# Patient Record
Sex: FEMALE | Race: WHITE | NOT HISPANIC OR LATINO | Employment: FULL TIME | URBAN - METROPOLITAN AREA
[De-identification: names, ages, dates, MRNs, and addresses within clinical notes are randomized per-mention and may not be internally consistent; named-entity substitution may affect disease eponyms.]

---

## 2024-04-04 ENCOUNTER — TELEPHONE (OUTPATIENT)
Dept: ADMINISTRATIVE | Facility: OTHER | Age: 29
End: 2024-04-04

## 2024-04-04 NOTE — TELEPHONE ENCOUNTER
Upon review of the In Basket request we were able to locate, review, and update the patient chart as requested for Pap Smear (HPV) aka Cervical Cancer Screening.    Any additional questions or concerns should be emailed to the Practice Liaisons via the appropriate education email address, please do not reply via In Basket.    Thank you  Isidra Oliva

## 2024-04-04 NOTE — TELEPHONE ENCOUNTER
----- Message from Stepan Urbina sent at 4/4/2024  9:42 AM EDT -----  Regarding: care gap request  04/04/24 9:42 AM    Hello, our patient attached above has had Pap Smear (HPV) aka Cervical Cancer Screening completed/performed. Please assist in updating the patient chart by pulling the Care Everywhere (CE) document. The date of service is 7/30/21.     Thank you,  Stepan Urbina  [facility]

## 2024-04-05 ENCOUNTER — OFFICE VISIT (OUTPATIENT)
Dept: FAMILY MEDICINE CLINIC | Facility: CLINIC | Age: 29
End: 2024-04-05
Payer: COMMERCIAL

## 2024-04-05 VITALS
SYSTOLIC BLOOD PRESSURE: 128 MMHG | BODY MASS INDEX: 28.49 KG/M2 | OXYGEN SATURATION: 97 % | HEIGHT: 68 IN | DIASTOLIC BLOOD PRESSURE: 80 MMHG | WEIGHT: 188 LBS | HEART RATE: 108 BPM | RESPIRATION RATE: 20 BRPM

## 2024-04-05 DIAGNOSIS — E55.9 VITAMIN D DEFICIENCY: ICD-10-CM

## 2024-04-05 DIAGNOSIS — Z00.00 PHYSICAL EXAM: Primary | ICD-10-CM

## 2024-04-05 DIAGNOSIS — E03.8 HYPOTHYROIDISM DUE TO HASHIMOTO'S THYROIDITIS: ICD-10-CM

## 2024-04-05 DIAGNOSIS — N92.6 ABNORMAL MENSES: ICD-10-CM

## 2024-04-05 DIAGNOSIS — R03.0 TRANSIENT HYPERTENSION: ICD-10-CM

## 2024-04-05 DIAGNOSIS — Z12.4 CERVICAL CANCER SCREENING: ICD-10-CM

## 2024-04-05 DIAGNOSIS — E78.2 MIXED HYPERLIPIDEMIA: ICD-10-CM

## 2024-04-05 DIAGNOSIS — E06.3 HYPOTHYROIDISM DUE TO HASHIMOTO'S THYROIDITIS: ICD-10-CM

## 2024-04-05 PROBLEM — E78.5 HYPERLIPIDEMIA: Status: ACTIVE | Noted: 2024-04-05

## 2024-04-05 PROBLEM — E03.9 ACQUIRED HYPOTHYROIDISM: Status: ACTIVE | Noted: 2019-05-01

## 2024-04-05 PROBLEM — E03.9 ACQUIRED HYPOTHYROIDISM: Status: RESOLVED | Noted: 2019-05-01 | Resolved: 2024-04-05

## 2024-04-05 PROCEDURE — 99204 OFFICE O/P NEW MOD 45 MIN: CPT | Performed by: FAMILY MEDICINE

## 2024-04-05 RX ORDER — LEVOTHYROXINE SODIUM 50 MCG
TABLET ORAL
COMMUNITY
Start: 2019-08-01

## 2024-04-05 NOTE — ASSESSMENT & PLAN NOTE
Currently well controlled with Synthroid 50 mcg. Used to follow with endocrine. Insurance changed and has been following with old PCP regarding endocrine.

## 2024-04-05 NOTE — PROGRESS NOTES
Name: Jessika Smalls      : 1995      MRN: [id number]  Encounter Provider: Romel Brock MD  Encounter Date: 2024   Encounter department: [facility]    Assessment & Plan     1. Physical exam  Comments:  Patient establishing care. CBC, CMP, TSH, lipid panel, Vit. D levels ordered to be evaluated next visit. F/u in 1 year or sooner if needed.    2. Abnormal menses  Assessment & Plan:  Was on OCP before.  Had to come off for BP.  Has had irreg menses lately, about every 38 days.  Can follow with GYN if concerns.          3. Mixed hyperlipidemia  Assessment & Plan:  History of hyperlipidemia.  Her previous cholesterol was quite a bit elevated at 211.  Will recheck.    Orders:  -     Comprehensive metabolic panel; Future; Expected date: 2024  -     Lipid panel; Future; Expected date: 2024    4. Hypothyroidism due to Hashimoto's thyroiditis  Assessment & Plan:  >>ASSESSMENT AND PLAN FOR ACQUIRED HYPOTHYROIDISM WRITTEN ON 2024  9:02 AM BY SARAH SOLORZANO    Currently well controlled with Synthroid 50 mcg. Used to follow with endocrine. Insurance changed and has been following with old PCP regarding endocrine.     Orders:  -     TSH, 3rd generation with Free T4 reflex; Future; Expected date: 2024    5. Transient hypertension  Assessment & Plan:  Patient experiences elevated BP readings prior to going to the doctors office. 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 Patient has been evaluated multiple times with EKGs and ECHOs which have been normal.   Also has had some issues in the past on oral contraceptives.    Orders:  -     CBC and differential; Future; Expected date: 2024  -     Comprehensive metabolic panel; Future; Expected date: 2024    6. Vitamin D deficiency  Assessment & Plan:  History of vitamin D deficiency noted.  Check vitamin D level.    Orders:  -     Vitamin D 25 hydroxy; Future; Expected date: 2024    7. Cervical  "cancer screening  Comments:  Recommend gynecologic exam and Pap smear.  Last was in 2021  Orders:  -     Ambulatory Referral to Obstetrics / Gynecology; Future        Depression Screening and Follow-up Plan: Patient was screened for depression during today's encounter. They screened negative with a PHQ-2 score of 0.        Mariela Rosen is a 29 y.o. female with no significant PMHx presenting to the office for annual physical exam. Patient has never been to our practice before. She reports no new problems today. Patient is here to establish care. Would like to have updated labs as she has not had them updated in over 2 years. 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           Review of Systems   Constitutional:  Negative for chills and fever.   HENT:  Negative for congestion, ear pain, rhinorrhea and sore throat.    Eyes:  Negative for visual disturbance.   Respiratory:  Negative for cough and shortness of breath.    Cardiovascular:  Negative for chest pain and palpitations.   Gastrointestinal:  Negative for abdominal pain, constipation, diarrhea, nausea and vomiting.   Genitourinary:  Negative for dysuria and hematuria.   Musculoskeletal:  Negative for back pain.   Skin:  Negative for rash.   Neurological:  Negative for dizziness, weakness, light-headedness and headaches.   Psychiatric/Behavioral:  Negative for behavioral problems.    All other systems reviewed and are negative.      Current Outpatient Medications on File Prior to Visit   Medication Sig   • Synthroid 50 MCG tablet        Objective     /80 (BP Location: Left arm, Patient Position: Sitting, Cuff Size: Large)   Pulse (!) 108   Resp 20   Ht 5' 8\" (1.727 m)   Wt 85.3 kg (188 lb)   SpO2 97%   BMI 28.59 kg/m²     Physical Exam  Vitals and nursing note reviewed.   Constitutional:       General: She is not in acute distress.     Appearance: Normal appearance.   HENT:    "   Head: Normocephalic and atraumatic.      Right Ear: Tympanic membrane normal.      Left Ear: Tympanic membrane normal.      Mouth/Throat:      Mouth: Mucous membranes are moist.      Pharynx: Oropharynx is clear. No oropharyngeal exudate or posterior oropharyngeal erythema.   Eyes:      Extraocular Movements: Extraocular movements intact.      Conjunctiva/sclera: Conjunctivae normal.      Pupils: Pupils are equal, round, and reactive to light.   Neck:      Vascular: No carotid bruit.   Cardiovascular:      Rate and Rhythm: Normal rate and regular rhythm.      Heart sounds: Normal heart sounds. No murmur heard.  Pulmonary:      Effort: No respiratory distress.      Breath sounds: Normal breath sounds. No wheezing, rhonchi or rales.   Abdominal:      General: Bowel sounds are normal. There is no distension.      Palpations: Abdomen is soft.      Tenderness: There is no abdominal tenderness. There is no guarding or rebound.   Musculoskeletal:         General: No swelling.      Cervical back: Neck supple.   Lymphadenopathy:      Cervical: No cervical adenopathy.   Skin:     General: Skin is warm and dry.      Findings: No rash.   Neurological:      General: No focal deficit present.      Mental Status: She is alert.   Psychiatric:         Behavior: Behavior normal.       Romel Brock MD

## 2024-04-05 NOTE — ASSESSMENT & PLAN NOTE
Was on OCP before.  Had to come off for BP.  Has had irreg menses lately, about every 38 days.  Can follow with GYN if concerns.

## 2024-04-05 NOTE — ASSESSMENT & PLAN NOTE
>>ASSESSMENT AND PLAN FOR ACQUIRED HYPOTHYROIDISM WRITTEN ON 4/5/2024  9:02 AM BY SARAH SOLORZANO    Currently well controlled with Synthroid 50 mcg. Used to follow with endocrine. Insurance changed and has been following with old PCP regarding endocrine.

## 2024-04-05 NOTE — ASSESSMENT & PLAN NOTE
History of hyperlipidemia.  Her previous cholesterol was quite a bit elevated at 211.  Will recheck.

## 2024-04-05 NOTE — PATIENT INSTRUCTIONS
1. Physical exam  Comments:  Patient establishing care. CBC, CMP, TSH, lipid panel, Vit. D levels ordered to be evaluated next visit. F/u in 1 year or sooner if needed.    2. Abnormal menses  Assessment & Plan:  Was on OCP before.  Had to come off for BP.  Has had irreg menses lately, about every 38 days.  Can follow with GYN if concerns.          3. Mixed hyperlipidemia  Assessment & Plan:  History of hyperlipidemia.  Her previous cholesterol was quite a bit elevated at 211.  Will recheck.    Orders:  -     Comprehensive metabolic panel; Future; Expected date: 04/05/2024  -     Lipid panel; Future; Expected date: 04/05/2024    4. Hypothyroidism due to Hashimoto's thyroiditis  Assessment & Plan:  >>ASSESSMENT AND PLAN FOR ACQUIRED HYPOTHYROIDISM WRITTEN ON 4/5/2024  9:02 AM BY SARAH SOLORZANO    Currently well controlled with Synthroid 50 mcg. Used to follow with endocrine. Insurance changed and has been following with old PCP regarding endocrine.     Orders:  -     TSH, 3rd generation with Free T4 reflex; Future; Expected date: 04/05/2024    5. Transient hypertension  Assessment & Plan:  Patient experiences elevated BP readings prior to going to the doctors office. 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 Patient has been evaluated multiple times with EKGs and ECHOs which have been normal.   Also has had some issues in the past on oral contraceptives.    Orders:  -     CBC and differential; Future; Expected date: 04/05/2024  -     Comprehensive metabolic panel; Future; Expected date: 04/05/2024    6. Vitamin D deficiency  Assessment & Plan:  History of vitamin D deficiency noted.  Check vitamin D level.    Orders:  -     Vitamin D 25 hydroxy; Future; Expected date: 04/05/2024    7. Cervical cancer screening  Comments:  Recommend gynecologic exam and Pap smear.  Last was in 2021        COVID 19 Instructions    Jessika Smalls was advised to limit contact with others to essential tasks such as getting food,  medications, and medical care.    Proper handwashing reviewed, and Hand sanitzer when washing is not available.    If the patient develops symptoms of COVID 19, the patient should call the office as soon as possible.    It is strongly recommended that Flu Vaccinations be obtained.      Virtual Visits:  Darlene: This works on smart phones (any phone with Internet browsing capability).  You should get a text message when the provider is ready to see you.  Click on the link in the text message, and the call should start.  You will need to type in your name, and allow camera and microphone access.  This is HIPPA compliant, and secure.      If you have not already done so, get immunized to COVID 19.      We are committed to getting you vaccinated as soon as possible and will be closely following CDC and [facility] guidelines as they are released and revised.  Please refer to our COVID-19 vaccine webpage for the most up to date information on the vaccine and our distribution efforts.    This site will also have the most up to date recommendations for COVID booster vaccine.    https://[email or web address].org/covid-19/[email or web address]-vaccine    Call [phone]-[phone] ([phone]), option 7    You can also visit https://www.vaccines.gov/ to find vaccines in your area.    OUR LOCATION:    [facility]  [address] [place] [address], [address]  [place], [place], [address] [phone]  Fax: [phone]    Lab services, Rheumatology, and OB/GYN are at this location as well.

## 2024-04-05 NOTE — PROGRESS NOTES
"Name: Jessika Smalls      : 1995      MRN: [id number]  Encounter Provider: Romel Brock MD  Encounter Date: 2024   Encounter department: [facility]    Assessment & Plan     1. Physical exam  Comments:  Patient establishing care. CBC, CMP, TSH, lipid panel, Vit. D levels ordered to be evaluated next visit. F/u in 1 year or sooner if needed.    2. Abnormal menses  Assessment & Plan:  Was on OCP before.  Had to come off for BP.  Has had irreg menses lately, about every 38 days.  Can follow with GYN if concerns.          3. Mixed hyperlipidemia  Assessment & Plan:  History of hyperlipidemia.  Her previous cholesterol was quite a bit elevated at 211.  Will recheck.      4. Hypothyroidism due to Hashimoto's thyroiditis  Assessment & Plan:  >>ASSESSMENT AND PLAN FOR ACQUIRED HYPOTHYROIDISM WRITTEN ON 2024  9:02 AM BY SARAH SOLORZANO    Currently well controlled with Synthroid 50 mcg. Used to follow with endocrine. Insurance changed and has been following with old PCP regarding endocrine.       5. Transient hypertension  Assessment & Plan:  Patient experiences elevated BP readings prior to going to the doctors office. 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 Patient has been evaluated multiple times with EKGs and ECHOs which have been normal.   Also has had some issues in the past on oral contraceptives.      6. Vitamin D deficiency  Assessment & Plan:  History of vitamin D deficiency noted.  Check vitamin D level.      7. Cervical cancer screening  Comments:  Recommend gynecologic exam and Pap smear.  Last was in            Subjective      HPI  Review of Systems    Current Outpatient Medications on File Prior to Visit   Medication Sig   • Synthroid 50 MCG tablet        Objective     /80 (BP Location: Left arm, Patient Position: Sitting, Cuff Size: Large)   Pulse (!) 108   Resp 20   Ht 5' 8\" (1.727 m)   Wt 85.3 kg (188 lb)   SpO2 97%   BMI 28.59 kg/m² "     Physical Exam  Romel Brock MD

## 2024-04-05 NOTE — ASSESSMENT & PLAN NOTE
Patient experiences elevated BP readings prior to going to the doctors office. 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 Patient has been evaluated multiple times with EKGs and ECHOs which have been normal.   Also has had some issues in the past on oral contraceptives.

## 2024-04-19 LAB
25(OH)D3+25(OH)D2 SERPL-MCNC: 28 NG/ML (ref 30–100)
ALBUMIN SERPL-MCNC: 3.9 G/DL (ref 3.5–5.7)
ALP SERPL-CCNC: 32 U/L (ref 35–120)
ALT SERPL-CCNC: 9 U/L
ANION GAP SERPL CALCULATED.3IONS-SCNC: 8 MMOL/L (ref 3–11)
AST SERPL-CCNC: 13 U/L
BASOPHILS # BLD AUTO: 0 THOU/CMM (ref 0–0.1)
BASOPHILS NFR BLD AUTO: 1 %
BILIRUB SERPL-MCNC: 0.8 MG/DL (ref 0.2–1)
BUN SERPL-MCNC: 15 MG/DL (ref 7–25)
CALCIUM SERPL-MCNC: 9.4 MG/DL (ref 8.5–10.1)
CHLORIDE SERPL-SCNC: 101 MMOL/L (ref 100–109)
CHOLEST SERPL-MCNC: 201 MG/DL
CHOLEST/HDLC SERPL: 4.4 {RATIO}
CO2 SERPL-SCNC: 30 MMOL/L (ref 21–31)
CREAT SERPL-MCNC: 0.87 MG/DL (ref 0.4–1.1)
CYTOLOGY CMNT CVX/VAG CYTO-IMP: ABNORMAL
DIFFERENTIAL METHOD BLD: ABNORMAL
EOSINOPHIL # BLD AUTO: 0.2 THOU/CMM (ref 0–0.5)
EOSINOPHIL NFR BLD AUTO: 3 %
ERYTHROCYTE [DISTWIDTH] IN BLOOD BY AUTOMATED COUNT: 12.6 % (ref 12–16)
GFR/BSA.PRED SERPLBLD CYS-BASED-ARV: 93 ML/MIN/{1.73_M2}
GLUCOSE SERPL-MCNC: 81 MG/DL (ref 65–99)
HCT VFR BLD AUTO: 43.2 % (ref 35–43)
HDLC SERPL-MCNC: 46 MG/DL (ref 23–92)
HGB BLD-MCNC: 14.9 G/DL (ref 11.5–14.5)
LDLC SERPL CALC-MCNC: 135 MG/DL
LYMPHOCYTES # BLD AUTO: 3.3 THOU/CMM (ref 1–3)
LYMPHOCYTES NFR BLD AUTO: 41 %
MCH RBC QN AUTO: 30.4 PG (ref 26–34)
MCHC RBC AUTO-ENTMCNC: 34.5 G/DL (ref 32–37)
MCV RBC AUTO: 88 FL (ref 80–100)
MONOCYTES # BLD AUTO: 0.5 THOU/CMM (ref 0.3–1)
MONOCYTES NFR BLD AUTO: 7 %
NEUTROPHILS # BLD AUTO: 4 THOU/CMM (ref 1.8–7.8)
NEUTROPHILS NFR BLD AUTO: 48 %
NONHDLC SERPL-MCNC: 155 MG/DL
PLATELET # BLD AUTO: 225 THOU/CMM (ref 140–350)
PMV BLD REES-ECKER: 10.4 FL (ref 7.5–11.3)
POTASSIUM SERPL-SCNC: 4.8 MMOL/L (ref 3.5–5.2)
PROT SERPL-MCNC: 6.9 G/DL (ref 6.3–8.3)
RBC # BLD AUTO: 4.91 MILL/CMM (ref 3.7–4.7)
SODIUM SERPL-SCNC: 139 MMOL/L (ref 135–145)
TRIGL SERPL-MCNC: 99 MG/DL
TSH SERPL-ACNC: 3.79 UIU/ML (ref 0.45–5.33)
WBC # BLD AUTO: 8.1 THOU/CMM (ref 4–10)

## 2024-04-22 NOTE — RESULT ENCOUNTER NOTE
Tests were not normal, and should follow at  your scheduled Office visit. Please call about this, if [employer] message is not available or not read by patient.

## 2024-04-25 DIAGNOSIS — E03.8 HYPOTHYROIDISM DUE TO HASHIMOTO'S THYROIDITIS: Primary | ICD-10-CM

## 2024-04-25 DIAGNOSIS — E06.3 HYPOTHYROIDISM DUE TO HASHIMOTO'S THYROIDITIS: Primary | ICD-10-CM

## 2024-04-26 RX ORDER — LEVOTHYROXINE SODIUM 50 MCG
50 TABLET ORAL DAILY
Qty: 30 TABLET | Refills: 5 | Status: SHIPPED | OUTPATIENT
Start: 2024-04-26 | End: 2024-05-26

## 2024-10-28 DIAGNOSIS — E06.3 HYPOTHYROIDISM DUE TO HASHIMOTO'S THYROIDITIS: ICD-10-CM

## 2024-10-29 RX ORDER — LEVOTHYROXINE SODIUM 50 MCG
50 TABLET ORAL DAILY
Qty: 90 TABLET | Refills: 1 | Status: SHIPPED | OUTPATIENT
Start: 2024-10-29 | End: 2025-04-27

## 2025-06-11 DIAGNOSIS — E06.3 HYPOTHYROIDISM DUE TO HASHIMOTO'S THYROIDITIS: ICD-10-CM

## 2025-06-11 RX ORDER — LEVOTHYROXINE SODIUM 50 UG/1
50 TABLET ORAL DAILY
Qty: 30 TABLET | Refills: 5 | OUTPATIENT
Start: 2025-06-11